# Patient Record
Sex: FEMALE | Race: WHITE
[De-identification: names, ages, dates, MRNs, and addresses within clinical notes are randomized per-mention and may not be internally consistent; named-entity substitution may affect disease eponyms.]

---

## 2020-06-13 ENCOUNTER — HOSPITAL ENCOUNTER (EMERGENCY)
Dept: HOSPITAL 75 - ER | Age: 30
Discharge: HOME | End: 2020-06-13
Payer: COMMERCIAL

## 2020-06-13 VITALS — HEIGHT: 66.93 IN | BODY MASS INDEX: 22.01 KG/M2 | WEIGHT: 140.21 LBS

## 2020-06-13 VITALS — DIASTOLIC BLOOD PRESSURE: 71 MMHG | SYSTOLIC BLOOD PRESSURE: 117 MMHG

## 2020-06-13 DIAGNOSIS — F17.200: ICD-10-CM

## 2020-06-13 DIAGNOSIS — N93.9: ICD-10-CM

## 2020-06-13 DIAGNOSIS — Z20.828: ICD-10-CM

## 2020-06-13 DIAGNOSIS — Z88.1: ICD-10-CM

## 2020-06-13 DIAGNOSIS — J06.9: Primary | ICD-10-CM

## 2020-06-13 DIAGNOSIS — Z88.2: ICD-10-CM

## 2020-06-13 LAB
ALBUMIN SERPL-MCNC: 4.2 GM/DL (ref 3.2–4.5)
ALP SERPL-CCNC: 65 U/L (ref 40–136)
ALT SERPL-CCNC: 9 U/L (ref 0–55)
APTT PPP: (no result) S
BACTERIA #/AREA URNS HPF: (no result) /HPF
BASOPHILS # BLD AUTO: 0 10^3/UL (ref 0–0.1)
BASOPHILS NFR BLD AUTO: 1 % (ref 0–10)
BILIRUB SERPL-MCNC: 0.7 MG/DL (ref 0.1–1)
BILIRUB UR QL STRIP: NEGATIVE
BUN/CREAT SERPL: 15
CALCIUM SERPL-MCNC: 8.9 MG/DL (ref 8.5–10.1)
CAOX CRY #/AREA URNS LPF: (no result) /LPF
CHLORIDE SERPL-SCNC: 106 MMOL/L (ref 98–107)
CO2 SERPL-SCNC: 24 MMOL/L (ref 21–32)
CREAT SERPL-MCNC: 0.82 MG/DL (ref 0.6–1.3)
EOSINOPHIL # BLD AUTO: 0.1 10^3/UL (ref 0–0.3)
EOSINOPHIL NFR BLD AUTO: 2 % (ref 0–10)
ERYTHROCYTE [DISTWIDTH] IN BLOOD BY AUTOMATED COUNT: 16.2 % (ref 10–14.5)
FIBRINOGEN PPP-MCNC: (no result) MG/DL
GFR SERPLBLD BASED ON 1.73 SQ M-ARVRAT: > 60 ML/MIN
GLUCOSE SERPL-MCNC: 118 MG/DL (ref 70–105)
GLUCOSE UR STRIP-MCNC: NEGATIVE MG/DL
HCT VFR BLD CALC: 39 % (ref 35–52)
HGB BLD-MCNC: 12.2 G/DL (ref 11.5–16)
KETONES UR QL STRIP: NEGATIVE
LEUKOCYTE ESTERASE UR QL STRIP: (no result)
LYMPHOCYTES # BLD AUTO: 1.9 X 10^3 (ref 1–4)
LYMPHOCYTES NFR BLD AUTO: 37 % (ref 12–44)
MANUAL DIFFERENTIAL PERFORMED BLD QL: NO
MCH RBC QN AUTO: 29 PG (ref 25–34)
MCHC RBC AUTO-ENTMCNC: 32 G/DL (ref 32–36)
MCV RBC AUTO: 93 FL (ref 80–99)
MONOCYTES # BLD AUTO: 0.3 X 10^3 (ref 0–1)
MONOCYTES NFR BLD AUTO: 5 % (ref 0–12)
NEUTROPHILS # BLD AUTO: 2.9 X 10^3 (ref 1.8–7.8)
NEUTROPHILS NFR BLD AUTO: 55 % (ref 42–75)
NITRITE UR QL STRIP: NEGATIVE
PH UR STRIP: 5.5 [PH] (ref 5–9)
PLATELET # BLD: 426 10^3/UL (ref 130–400)
PMV BLD AUTO: 9.4 FL (ref 7.4–10.4)
POTASSIUM SERPL-SCNC: 3.8 MMOL/L (ref 3.6–5)
PROT SERPL-MCNC: 7 GM/DL (ref 6.4–8.2)
PROT UR QL STRIP: (no result)
RBC #/AREA URNS HPF: (no result) /HPF
SODIUM SERPL-SCNC: 140 MMOL/L (ref 135–145)
SP GR UR STRIP: 1.02 (ref 1.02–1.02)
SQUAMOUS #/AREA URNS HPF: (no result) /HPF
WBC # BLD AUTO: 5.3 10^3/UL (ref 4.3–11)
WBC #/AREA URNS HPF: (no result) /HPF

## 2020-06-13 PROCEDURE — 85025 COMPLETE CBC W/AUTO DIFF WBC: CPT

## 2020-06-13 PROCEDURE — 87491 CHLMYD TRACH DNA AMP PROBE: CPT

## 2020-06-13 PROCEDURE — 87635 SARS-COV-2 COVID-19 AMP PRB: CPT

## 2020-06-13 PROCEDURE — 84703 CHORIONIC GONADOTROPIN ASSAY: CPT

## 2020-06-13 PROCEDURE — 87088 URINE BACTERIA CULTURE: CPT

## 2020-06-13 PROCEDURE — 36415 COLL VENOUS BLD VENIPUNCTURE: CPT

## 2020-06-13 PROCEDURE — 87070 CULTURE OTHR SPECIMN AEROBIC: CPT

## 2020-06-13 PROCEDURE — 99284 EMERGENCY DEPT VISIT MOD MDM: CPT

## 2020-06-13 PROCEDURE — 87205 SMEAR GRAM STAIN: CPT

## 2020-06-13 PROCEDURE — 81000 URINALYSIS NONAUTO W/SCOPE: CPT

## 2020-06-13 PROCEDURE — 87210 SMEAR WET MOUNT SALINE/INK: CPT

## 2020-06-13 PROCEDURE — 80053 COMPREHEN METABOLIC PANEL: CPT

## 2020-06-13 PROCEDURE — 86141 C-REACTIVE PROTEIN HS: CPT

## 2020-06-13 PROCEDURE — 87077 CULTURE AEROBIC IDENTIFY: CPT

## 2020-06-13 PROCEDURE — 87591 N.GONORRHOEAE DNA AMP PROB: CPT

## 2020-06-13 NOTE — ED GENERAL
General


Chief Complaint:  Cough/Cold/Flu Symptoms


Stated Complaint:  COUGH / VAGINAL BLEEDING


Nursing Triage Note:  


ARRIVED VIA AMB WITH COMPLAINTS OF VAGINAL BLEEDING OFF AND ON FOR X6 WEEKS.  


ALSO COMPLAINS OF COUGH X1 WEEK ET THIS AM STARTED HAVING SOA.


Nursing Sepsis Screen:  No Definite Risk


Source of Information:  Patient


Exam Limitations:  No Limitations





History of Present Illness


Date Seen by Provider:  Jun 13, 2020


Time Seen by Provider:  10:25


Initial Comments


Here with complaint of vaginal bleeding that has been going on off for the last 

6 weeks and she is concerned that she may have a tubal pregnancy read she has 

had a tubal ligation no. Denies lower abdominal pain but does complain of cough 

and shortness of breath as well as runny nose and some body aches. She just 

moved here from [facility] and is staying at an apartment with friends 

and her boyfriend. No others are sick around her currently. Unsure about fevers.

Last sexually active a few days ago and that was not painful. Denies vaginal 

discharge. Concerned about the amount of blood that she has lost over the last 6

weeks. Eating and drinking okay but states that her appetite is decreased 

recently.


Timing/Duration:  1 Day (for the bleeding), 3-4 Days (from the cough and 

shortness of air)


Severity:  Moderate


Associated Systoms:  Cough; No Fever/Chills; Loss of Appetite, Malaise; No 

Nausea/Vomiting; Shortness of Air; No Weakness





Allergies and Home Medications


Allergies


Coded Allergies:  


     Sulfa (Sulfonamide Antibiotics) (Verified  Allergy, Severe, SOA, 6/13/20)


     ceftriaxone (Verified  Allergy, Severe, RASH, 6/13/20)





Home Medications


No Active Prescriptions or Reported Meds





Patient Home Medication List


Home Medication List Reviewed:  Yes





Review of Systems


Review of Systems


Constitutional:  see HPI; No chills, No fever


EENTM:  no symptoms reported


Respiratory:  see HPI


Cardiovascular:  no symptoms reported


Gastrointestinal:  No diarrhea, No nausea, No vomiting


Genitourinary:  No discharge, No dysuria, No frequency; other (vaginal bleeding)


Musculoskeletal:  see HPI; No back pain, No neck pain


Skin:  no symptoms reported


Psychiatric/Neurological:  No Symptoms Reported





All Other Systems Reviewed


Negative Unless Noted:  Yes





Past Medical-Social-Family Hx


Past Med/Social Hx:  Reviewed Nursing Past Med/Soc Hx


Patient Social History


Alcohol Use:  Occasionally Uses


Recreational Drug Use:  Yes


Drug of Choice:  POT


Smoking Status:  Current Everyday Smoker


Recent Foreign Travel:  No


Contact w/Someone Who Travel:  No


Recent Infectious Disease Expo:  No


Recent Hopitalizations:  No





Seasonal Allergies


Seasonal Allergies:  No





Past Medical History


Surgeries:  Yes


Tubal Ligation


Respiratory:  No


Cardiac:  No


Neurological:  No


Genitourinary:  No


Gastrointestinal:  No


Musculoskeletal:  No


Endocrine:  No


HEENT:  No


Cancer:  No


Psychosocial:  No


Integumentary:  No





Family Medical History


Reviewed Nursing Family Hx


No Pertinent Family Hx





Physical Exam


Vital Signs





Vital Signs - First Documented








 6/13/20





 10:35


 


Temp 37.0


 


Pulse 100


 


Resp 16


 


B/P (MAP) 119/93 (102)


 


Pulse Ox 100


 


O2 Delivery Room Air





Capillary Refill : Less Than 3 Seconds


Height, Weight, BMI


Height: '"


Weight: lbs. oz. kg; 22.00 BMI


Method:


General Appearance:  No Apparent Distress, WD/WN


HEENT:  PERRL/EOMI, Pharynx Normal


Neck:  Non Tender, Supple


Respiratory:  Lungs Clear, Normal Breath Sounds


Cardiovascular:  No Murmur, Tachycardia


Gastrointestinal:  Non Tender, Soft


Genital/Rectal:  Other (pelvic exam shows normal external exam with vaginal 

bleeding. No significant discharge and nontender adnexa and no cervical motion 

tenderness.)


Extremity:  Normal Range of Motion, Non Tender


Neurologic/Psychiatric:  Alert, Oriented x3


Skin:  Normal Color, Warm/Dry





Progress/Results/Core Measures


Suspected Sepsis


Recent Fever Within 48 Hours:  No


Infection Criteria Present:  None


New/Unexplained  Altered Menta:  No


Sepsis Screen:  No Definite Risk


SIRS


Temperature: 


Pulse: 100 


Respiratory Rate: 16


 


Laboratory Tests


6/13/20 10:37: White Blood Count 5.3


Blood Pressure 119 /93 


Mean: 102


 











Laboratory Tests


6/13/20 10:37: 


Creatinine 0.82, Platelet Count 426H, Total Bilirubin 0.7





Results/Orders


Lab Results





Laboratory Tests








Test


 6/13/20


10:37 6/13/20


10:44 6/13/20


10:54 6/13/20


11:45 Range/Units


 


 


White Blood Count


 5.3 


 


 


 


 4.3-11.0


10^3/uL


 


Red Blood Count


 4.15 L


 


 


 


 4.35-5.85


10^6/uL


 


Hemoglobin 12.2     11.5-16.0  G/DL


 


Hematocrit 39     35-52  %


 


Mean Corpuscular Volume 93     80-99  FL


 


Mean Corpuscular Hemoglobin 29     25-34  PG


 


Mean Corpuscular Hemoglobin


Concent 32 


 


 


 


 32-36  G/DL





 


Red Cell Distribution Width 16.2 H    10.0-14.5  %


 


Platelet Count


 426 H


 


 


 


 130-400


10^3/uL


 


Mean Platelet Volume 9.4     7.4-10.4  FL


 


Neutrophils (%) (Auto) 55     42-75  %


 


Lymphocytes (%) (Auto) 37     12-44  %


 


Monocytes (%) (Auto) 5     0-12  %


 


Eosinophils (%) (Auto) 2     0-10  %


 


Basophils (%) (Auto) 1     0-10  %


 


Neutrophils # (Auto) 2.9     1.8-7.8  X 10^3


 


Lymphocytes # (Auto) 1.9     1.0-4.0  X 10^3


 


Monocytes # (Auto) 0.3     0.0-1.0  X 10^3


 


Eosinophils # (Auto)


 0.1 


 


 


 


 0.0-0.3


10^3/uL


 


Basophils # (Auto)


 0.0 


 


 


 


 0.0-0.1


10^3/uL


 


Sodium Level 140     135-145  MMOL/L


 


Potassium Level 3.8     3.6-5.0  MMOL/L


 


Chloride Level 106       MMOL/L


 


Carbon Dioxide Level 24     21-32  MMOL/L


 


Anion Gap 10     5-14  MMOL/L


 


Blood Urea Nitrogen 12     7-18  MG/DL


 


Creatinine


 0.82 


 


 


 


 0.60-1.30


MG/DL


 


Estimat Glomerular Filtration


Rate > 60 


 


 


 


  





 


BUN/Creatinine Ratio 15      


 


Glucose Level 118 H      MG/DL


 


Calcium Level 8.9     8.5-10.1  MG/DL


 


Corrected Calcium 8.7     8.5-10.1  MG/DL


 


Total Bilirubin 0.7     0.1-1.0  MG/DL


 


Aspartate Amino Transf


(AST/SGOT) 15 


 


 


 


 5-34  U/L





 


Alanine Aminotransferase


(ALT/SGPT) 9 


 


 


 


 0-55  U/L





 


Alkaline Phosphatase 65       U/L


 


C-Reactive Protein High


Sensitivity 0.01 


 


 


 


 0.00-0.50


MG/DL


 


Total Protein 7.0     6.4-8.2  GM/DL


 


Albumin 4.2     3.2-4.5  GM/DL


 


Serum Pregnancy Test,


Qualitative NEGATIVE 


 


 


 


 NEGATIVE  





 


Urine Color    RED H  


 


Urine Clarity    CLOUDY   


 


Urine pH    5.5  5-9  


 


Urine Specific Gravity    1.020  1.016-1.022  


 


Urine Protein    2+ H NEGATIVE  


 


Urine Glucose (UA)    NEGATIVE  NEGATIVE  


 


Urine Ketones    NEGATIVE  NEGATIVE  


 


Urine Nitrite    NEGATIVE  NEGATIVE  


 


Urine Bilirubin    NEGATIVE  NEGATIVE  


 


Urine Urobilinogen    1.0  < = 1.0  MG/DL


 


Urine Leukocyte Esterase    TRACE H NEGATIVE  


 


Urine RBC (Auto)    3+ H NEGATIVE  


 


Urine RBC    TNTC H  /HPF


 


Urine WBC    2-5   /HPF


 


Urine Squamous Epithelial


Cells 


 


 


 2-5 


  /HPF





 


Urine Crystals    PRESENT H  /LPF


 


Urine Calcium Oxalate Crystals    FEW H  /LPF


 


Urine Bacteria    MODERATE H  /HPF


 


Urine Casts    NONE   /LPF


 


Urine Mucus    NEGATIVE   /LPF


 


Urine Culture Indicated    YES   








Micro Results





Microbiology


6/13/20 Genital Culture, Resulted


          Pending


6/13/20 Wet Prep - Final, Resulted


          





My Orders





Orders - KHUSHBOO JEONG MD


Lactated Ringers (Lr 1000 Ml Iv Solution (6/13/20 10:50)


Cbc With Automated Diff (6/13/20 10:57)


Comprehensive Metabolic Panel (6/13/20 10:57)


Hs C Reactive Protein (6/13/20 10:57)


Hcg,Qualitative Serum (6/13/20 10:57)


Ed Iv/Invasive Line Start (6/13/20 10:57)


Lactated Ringers (Lr 1000 Ml Iv Solution (6/13/20 10:57)


Wet Prep (6/13/20 10:57)


Neisseria Gonorrhea Swab (6/13/20 10:57)


Genital Culture (6/13/20 10:57)


Chlamydia Trachomatis Swab (6/13/20 10:57)


Ua Culture If Indicated (6/13/20 10:57)


Coronavirus Sars-Cov-2 So 2019 (6/13/20 10:59)


Urine Culture (6/13/20 11:45)





Medications Given in ED





Current Medications








 Medications  Dose


 Ordered  Sig/Grabiel


 Route  Start Time


 Stop Time Status Last Admin


Dose Admin


 


 Lactated Ringer's  1,000 ml @ 


 0 mls/hr  Q0M ONCE


 IV  6/13/20 10:57


 6/13/20 11:00 DC 6/13/20 11:00


1,000 MLS/HR








Vital Signs/I&O











 6/13/20





 10:35


 


Temp 37.0


 


Pulse 100


 


Resp 16


 


B/P (MAP) 119/93 (102)


 


Pulse Ox 100


 


O2 Delivery Room Air





Capillary Refill : Less Than 3 Seconds








Blood Pressure Mean:                    102








Progress Note :  


Progress Note


Seen and evaluated. This is a table tract workup for both COVID-19 and vaginal 

bleeding. IV, labs, UA, COVID-19 swab, LR 1 L bolus and vaginal swabs ordered. 

Pelvic done. Patient informed of concerns related to COVID-19 and understands 

quarantine requirements. Monitor patient. 1212: No acute findings. Contamination

and UA so we will wait for culture. Doing a little better. Discharged home with 

return precautions. Patient verbalize understanding instructions and agreement 

with plan.





Departure


Impression





   Primary Impression:  


   Upper respiratory infection


   Qualified Codes:  J06.9 - Acute upper respiratory infection, unspecified


   Additional Impressions:  


   COVID-19 evaluation


   Vaginal bleeding


Disposition:  01 HOME, SELF-CARE


Condition:  Improved





Departure-Patient Inst.


Decision time for Depature:  12:13


Referrals:  


NO,LOCAL PHYSICIAN (PCP)


Primary Care Physician








[facility]


Patient Instructions:  Coronavirus Disease 2019 (COVID-19) (DC), IRREGULAR 

VAGINAL BLEEDING, Viral Upper Respiratory Infection, Child (DC)





Add. Discharge Instructions:  








All discharge instructions reviewed with patient and/or family. Voiced 

understanding.





You will be quarantined until either results are noted to be negative or as 

directed by the health department if they are positive. Drink Plenty of fluids 

and get plenty of rest. Return for worse pain, fever, vomiting, weakness, breath

ing problems or other concerns as needed. You should follow-up with local 

primary care doctor regarding the irregular vaginal bleeding as you may benefit 

from hormone therapy to help with that.


Scripts


No Active Prescriptions or Reported Meds











KHUSHBOO JEONG MD          Jun 13, 2020 11:08

## 2020-06-13 NOTE — NUR
BROUGHT PATIENT TO ROOM FOR  COUGH AND VAG BLEEDING ON ADMIT TO ROOM REPORTS 
THAT SHE HAS HAD COUGH FOR ONSET OF SOA TODAY. THIS NURSE LEFT ROOM TO INFORM 
DR. SHELTON TO ROOM